# Patient Record
Sex: FEMALE | Race: BLACK OR AFRICAN AMERICAN
[De-identification: names, ages, dates, MRNs, and addresses within clinical notes are randomized per-mention and may not be internally consistent; named-entity substitution may affect disease eponyms.]

---

## 2020-07-14 ENCOUNTER — HOSPITAL ENCOUNTER (EMERGENCY)
Dept: HOSPITAL 63 - ER | Age: 30
End: 2020-07-14
Payer: COMMERCIAL

## 2020-07-14 VITALS — BODY MASS INDEX: 35.55 KG/M2 | WEIGHT: 200.62 LBS | HEIGHT: 63 IN

## 2020-07-14 VITALS — DIASTOLIC BLOOD PRESSURE: 101 MMHG | SYSTOLIC BLOOD PRESSURE: 177 MMHG

## 2020-07-14 DIAGNOSIS — N93.9: ICD-10-CM

## 2020-07-14 DIAGNOSIS — R10.32: ICD-10-CM

## 2020-07-14 DIAGNOSIS — I10: ICD-10-CM

## 2020-07-14 DIAGNOSIS — F17.210: ICD-10-CM

## 2020-07-14 DIAGNOSIS — N20.0: Primary | ICD-10-CM

## 2020-07-14 DIAGNOSIS — F12.90: ICD-10-CM

## 2020-07-14 DIAGNOSIS — R11.0: ICD-10-CM

## 2020-07-14 LAB
ALBUMIN SERPL-MCNC: 3.9 G/DL (ref 3.4–5)
ALBUMIN/GLOB SERPL: 1.1 {RATIO} (ref 1–1.7)
ALP SERPL-CCNC: 108 U/L (ref 46–116)
ALT SERPL-CCNC: 20 U/L (ref 14–59)
ANION GAP SERPL CALC-SCNC: 11 MMOL/L (ref 6–14)
APTT PPP: (no result) S
AST SERPL-CCNC: 12 U/L (ref 15–37)
BACTERIA #/AREA URNS HPF: (no result) /HPF
BASOPHILS # BLD AUTO: 0.1 X10^3/UL (ref 0–0.2)
BASOPHILS NFR BLD: 1 % (ref 0–3)
BILIRUB SERPL-MCNC: 0.1 MG/DL (ref 0.2–1)
BILIRUB UR QL STRIP: (no result)
BUN/CREAT SERPL: 9 (ref 6–20)
CA-I SERPL ISE-MCNC: 11 MG/DL (ref 7–20)
CALCIUM SERPL-MCNC: 9.2 MG/DL (ref 8.5–10.1)
CHLORIDE SERPL-SCNC: 104 MMOL/L (ref 98–107)
CO2 SERPL-SCNC: 26 MMOL/L (ref 21–32)
CREAT SERPL-MCNC: 1.2 MG/DL (ref 0.6–1)
EOSINOPHIL NFR BLD: 0.2 X10^3/UL (ref 0–0.7)
EOSINOPHIL NFR BLD: 2 % (ref 0–3)
ERYTHROCYTE [DISTWIDTH] IN BLOOD BY AUTOMATED COUNT: 13.6 % (ref 11.5–14.5)
FIBRINOGEN PPP-MCNC: (no result) MG/DL
GFR SERPLBLD BASED ON 1.73 SQ M-ARVRAT: 65.2 ML/MIN
GLOBULIN SER-MCNC: 3.6 G/DL (ref 2.2–3.8)
GLUCOSE SERPL-MCNC: 126 MG/DL (ref 70–99)
GLUCOSE UR STRIP-MCNC: (no result) MG/DL
HCT VFR BLD CALC: 38.2 % (ref 36–47)
HGB BLD-MCNC: 13 G/DL (ref 12–15.5)
LIPASE: 239 U/L (ref 73–393)
LYMPHOCYTES # BLD: 3.6 X10^3/UL (ref 1–4.8)
LYMPHOCYTES NFR BLD AUTO: 45 % (ref 24–48)
MCH RBC QN AUTO: 32 PG (ref 25–35)
MCHC RBC AUTO-ENTMCNC: 34 G/DL (ref 31–37)
MCV RBC AUTO: 95 FL (ref 79–100)
MONO #: 1.1 X10^3/UL (ref 0–1.1)
MONOCYTES NFR BLD: 14 % (ref 0–9)
NEUT #: 3 X10^3UL (ref 1.8–7.7)
NEUTROPHILS NFR BLD AUTO: 38 % (ref 31–73)
NITRITE UR QL STRIP: (no result)
PLATELET # BLD AUTO: 290 X10^3/UL (ref 140–400)
POTASSIUM SERPL-SCNC: 3.3 MMOL/L (ref 3.5–5.1)
PROT SERPL-MCNC: 7.5 G/DL (ref 6.4–8.2)
RBC # BLD AUTO: 4.03 X10^6/UL (ref 3.5–5.4)
RBC #/AREA URNS HPF: (no result) /HPF (ref 0–2)
SODIUM SERPL-SCNC: 141 MMOL/L (ref 136–145)
SP GR UR STRIP: 1.02
SQUAMOUS #/AREA URNS LPF: (no result) /LPF
UROBILINOGEN UR-MCNC: 0.2 MG/DL
WBC # BLD AUTO: 7.9 X10^3/UL (ref 4–11)
WBC #/AREA URNS HPF: (no result) /HPF (ref 0–4)

## 2020-07-14 PROCEDURE — 87086 URINE CULTURE/COLONY COUNT: CPT

## 2020-07-14 PROCEDURE — 81001 URINALYSIS AUTO W/SCOPE: CPT

## 2020-07-14 PROCEDURE — 80053 COMPREHEN METABOLIC PANEL: CPT

## 2020-07-14 PROCEDURE — 96374 THER/PROPH/DIAG INJ IV PUSH: CPT

## 2020-07-14 PROCEDURE — 85025 COMPLETE CBC W/AUTO DIFF WBC: CPT

## 2020-07-14 PROCEDURE — 99284 EMERGENCY DEPT VISIT MOD MDM: CPT

## 2020-07-14 PROCEDURE — 83690 ASSAY OF LIPASE: CPT

## 2020-07-14 PROCEDURE — 84702 CHORIONIC GONADOTROPIN TEST: CPT

## 2020-07-14 PROCEDURE — 36415 COLL VENOUS BLD VENIPUNCTURE: CPT

## 2020-07-14 PROCEDURE — 74176 CT ABD & PELVIS W/O CONTRAST: CPT

## 2020-07-14 NOTE — RAD
Examination: CT of the abdomen pelvis were performed without contrast. 

 

 

HISTORY: History of left flank pain

 

COMPARISON: None available.

 

TECHNIQUE: Axial CT images of the abdomen pelvis were performed without 

contrast. Coronal and sagittal reformats are performed

 

 

 

Exposure: One or more of the following individualized dose reduction 

techniques were utilized for this examination:  1. Automated exposure 

control  2. Adjustment of the mA and/or kV according to patient size  3. 

Use of iterative reconstruction technique

 

FINDINGS:

 

 

Minimal bibasilar lung atelectasis. No evidence of free air identified in 

the abdomen.

 

The evaluation of the solid organs is limited due to lack of IV contrast. 

The evaluation of bowel is limited due to lack of oral contrast. The 

visualized noncontrasted liver, spleen, adrenals grossly appears 

unremarkable. Gallbladder is mildly distended. The stomach is mildly 

distended with visualized pancreas grossly appears unremarkable. The small

bowel is nondilated. Feces and gas noted in the colon. The urinary bladder

is mildly distended.

 

Punctate intrarenal collecting system calculi identified in the left 

kidney with largest measuring 3 mm. There is mild fat stranding identified

about the left ureter.

 

Urinary bladder is mildly distended.

 

No evidence of lytic bony destructive lesion.

 

 

IMPRESSION:

 

1. Mild fat stranding identified about the left ureter could be secondary 

to pyelitis. Correlate for lab values.

 

2. Punctate intrarenal collecting system calculi left kidney.

 

 

 

Electronically signed by: Josh Thomas MD (7/14/2020 9:33 AM) [id number]

## 2020-07-14 NOTE — PHYS DOC
Past History


Past Medical History:  Hypertension


Past Surgical History:  No Surgical History


Smoking:  Cigarettes


Alcohol Use:  Occasionally


Drug Use:  Marijuana





General Adult


EDM:


Chief Complaint:  ABDOMINAL PAIN





HPI:


HPI:





Patient is a 29 year old female who presents for evaluation of left lower 

abdominal and side pain.  Onset of symptoms since about 12:30 AM.  Patient 

states that she has some vaginal bleeding and is currently on her period.  

Patient has some nausea but no vomiting or diarrhea.  No improvement of symptoms

after Tylenol.  Patient was otherwise benign appearing.  Patient states that the

symptoms do wax and wane but the cramping can be intense.  Pt states she is 

trying to get pregnant.





Review of Systems:


Review of Systems:


Constitutional:  Denies fever or chills 


Eyes:  Denies change in visual acuity 


HENT:  Denies nasal congestion or sore throat 


Respiratory:  Denies cough or shortness of breath 


Cardiovascular:  Denies chest pain or edema 


GI:  has left lower abdominal pain with nausea but no vomiting,no bloody stools 

or diarrhea 


: Denies dysuria 


Musculoskeletal:  left side lower back pain noted but no joint pain 


Integument:  Denies rash 


Neurologic:  Denies headache, focal weakness or sensory changes 


Endocrine:  Denies polyuria or polydipsia 


Lymphatic:  Denies swollen glands 


Psychiatric:  Denies depression or anxiety





Heart Score:


Risk Factors:


Risk Factors:  DM, Current or recent (<one month) smoker, HTN, HLP, family 

history of CAD, obesity.


Risk Scores:


Score 0 - 3:  2.5% MACE over next 6 weeks - Discharge Home


Score 4 - 6:  20.3% MACE over next 6 weeks - Admit for Clinical Observation


Score 7 - 10:  72.7% MACE over next 6 weeks - Early Invasive Strategies





Allergies:


Allergies:





Allergies








Coded Allergies Type Severity Reaction Last Updated Verified


 


  No Known Allergies Allergy Unknown  20 Yes











Physical Exam:


PE:





Constitutional: Well developed, well nourished, moderate distress, non-toxic 

appearance. []


HENT: Normocephalic, atraumatic, bilateral external ears normal, oropharynx 

moist, no oral exudates, nose normal. []


Eyes: PERRL, EOMI, conjunctiva normal, no discharge. [] 


Neck: Normal range of motion, no tenderness, supple. [] 


Cardiovascular:Heart rate regular rhythm, no murmur []


Lungs & Thorax:  Bilateral breath sounds clear to auscultation []


Abdomen: Bowel sounds normal, soft, no tenderness, no masses [] 


Skin: Warm, dry, no erythema, no rash. [] 


Back: No tenderness, left CVA tender. [] 


Extremities: No tenderness, no cyanosis, ROM intact, no edema. [] 


Neurologic: Alert and oriented X 3, normal motor function, normal sensory 

function, no focal deficits noted. []


Psychologic: Affect normal, judgement normal, anxious. []





Current Patient Data:


Labs:





Current Medications








 Medications


  (Trade)  Dose


 Ordered  Sig/Kash


 Route


 PRN Reason  Start Time


 Stop Time Status Last Admin


Dose Admin


 


 Sodium Chloride  1,000 ml @ 


 1,000 mls/hr  Q1H


 IV


   20 03:15


 20 04:14 DC 20 03:27





 


 Ketorolac


 Tromethamine


  (Toradol 15mg


 Vial)  15 mg  1X  ONCE


 IVP


   20 03:45


 20 03:46 DC 20 03:46











Vital Signs:





                                   Vital Signs








  Date Time  Temp Pulse Resp B/P (MAP) Pulse Ox O2 Delivery O2 Flow Rate FiO2


 


20 02:48 98.2 76 20 177/101 (126) 100 Room Air  











EKG:


EKG:


[]





Radiology/Procedures:


Radiology/Procedures:


[facility] [address], [place] [address]


                                 [phone]


                                        


                                 IMAGING REPORT





                                     Signed





PATIENT: DELANO CRAWFORD  ACCOUNT: [id number]     MRN#: [id number]


: 1990           LOCATION: ER              AGE: 29


SEX: F                    EXAM DT: 20         ACCESSION#: [id number]


STATUS: REG ER            ORD. PHYSICIAN: FRANCI BRAXTON DO


REASON: left flank pain


PROCEDURE: CT ABDOMEN PELVIS WO CONTRAST





Examination: CT of the abdomen pelvis were performed without contrast. 


 


 


HISTORY: History of left flank pain


 


COMPARISON: None available.


 


TECHNIQUE: Axial CT images of the abdomen pelvis were performed without 


contrast. Coronal and sagittal reformats are performed


 


 


 


Exposure: One or more of the following individualized dose reduction 


techniques were utilized for this examination:  1. Automated exposure 


control  2. Adjustment of the mA and/or kV according to patient size  3. 


Use of iterative reconstruction technique


 


FINDINGS:


 


 


Minimal bibasilar lung atelectasis. No evidence of free air identified in 


the abdomen.


 


The evaluation of the solid organs is limited due to lack of IV contrast. 


The evaluation of bowel is limited due to lack of oral contrast. The 


visualized noncontrasted liver, spleen, adrenals grossly appears 


unremarkable. Gallbladder is mildly distended. The stomach is mildly 


distended with visualized pancreas grossly appears unremarkable. The small


bowel is nondilated. Feces and gas noted in the colon. The urinary bladder


is mildly distended.


 


Punctate intrarenal collecting system calculi identified in the left 


kidney with largest measuring 3 mm. There is mild fat stranding identified


about the left ureter.


 


Urinary bladder is mildly distended.


 


No evidence of lytic bony destructive lesion.


 


 


IMPRESSION:


 


1. Mild fat stranding identified about the left ureter could be secondary 


to pyelitis. Correlate for lab values.


 


2. Punctate intrarenal collecting system calculi left kidney.


 


 


 


Electronically signed by: Josh Manley MD (2020 9:33 AM) [id number]














DICTATED AND SIGNED BY:     JOSH MANELY MD


DATE:     20 0933





CC: PCP,NO; FRANCI BRAXTON DO ~


[]





Course & Med Decision Making:


Course & Med Decision Making


Pertinent Labs and Imaging studies reviewed. (See chart for details)





[]





Dragon Disclaimer:


Dragon Disclaimer:


This electronic medical record was generated, in whole or in part, using a voice

 recognition dictation system.





0349 stable, CT scan abdomen pelvis kidney stone protocol ordered to evaluate 

for that left-sided abdominal and flank area pain.  Patient had blood and some w

epifanio blood cells in her urine.  





stable, chart completed later since EMR went off line for rest of my shift.   Rx

 for Keflex gien





Departure


Departure:


Impression:  


   Primary Impression:  


   Pyelonephritis


   Additional Impression:  


   Renal stone


Disposition:   HOME/RESIDENCE PRIOR TO ADM


Condition:  STABLE


Referrals:  


PCP,NO (PCP)


Patient Instructions:  Kidney Stones, Urinary Tract Infection





Justification of Admission:


Justification of Admission:


Justification of Admission Dx:  N/A











FRANCI BRAXTON DO              2020 03:04